# Patient Record
Sex: FEMALE | Race: WHITE | NOT HISPANIC OR LATINO
[De-identification: names, ages, dates, MRNs, and addresses within clinical notes are randomized per-mention and may not be internally consistent; named-entity substitution may affect disease eponyms.]

---

## 2023-10-22 PROBLEM — Z00.00 ENCOUNTER FOR PREVENTIVE HEALTH EXAMINATION: Status: ACTIVE | Noted: 2023-10-22

## 2023-10-23 ENCOUNTER — RESULT REVIEW (OUTPATIENT)
Age: 43
End: 2023-10-23

## 2023-10-23 ENCOUNTER — APPOINTMENT (OUTPATIENT)
Dept: BARIATRICS | Facility: CLINIC | Age: 43
End: 2023-10-23
Payer: COMMERCIAL

## 2023-10-23 ENCOUNTER — OUTPATIENT (OUTPATIENT)
Dept: OUTPATIENT SERVICES | Facility: HOSPITAL | Age: 43
LOS: 1 days | End: 2023-10-23
Payer: COMMERCIAL

## 2023-10-23 VITALS
BODY MASS INDEX: 36.07 KG/M2 | SYSTOLIC BLOOD PRESSURE: 133 MMHG | DIASTOLIC BLOOD PRESSURE: 90 MMHG | TEMPERATURE: 97.9 F | HEART RATE: 61 BPM | WEIGHT: 196 LBS | OXYGEN SATURATION: 98 % | HEIGHT: 62 IN

## 2023-10-23 DIAGNOSIS — Z78.9 OTHER SPECIFIED HEALTH STATUS: ICD-10-CM

## 2023-10-23 DIAGNOSIS — K83.8 OTHER SPECIFIED DISEASES OF BILIARY TRACT: ICD-10-CM

## 2023-10-23 DIAGNOSIS — Z86.39 PERSONAL HISTORY OF OTHER ENDOCRINE, NUTRITIONAL AND METABOLIC DISEASE: ICD-10-CM

## 2023-10-23 DIAGNOSIS — K80.20 CALCULUS OF GALLBLADDER W/OUT CHOLECYSTITIS W/OUT OBSTRUCTION: ICD-10-CM

## 2023-10-23 PROCEDURE — 71046 X-RAY EXAM CHEST 2 VIEWS: CPT

## 2023-10-23 PROCEDURE — 99204 OFFICE O/P NEW MOD 45 MIN: CPT

## 2023-10-23 PROCEDURE — 71046 X-RAY EXAM CHEST 2 VIEWS: CPT | Mod: 26

## 2023-10-23 RX ORDER — CIPROFLOXACIN HYDROCHLORIDE 750 MG/1
TABLET, FILM COATED ORAL
Refills: 0 | Status: ACTIVE | COMMUNITY

## 2023-10-23 RX ORDER — METRONIDAZOLE 500 MG/1
500 TABLET ORAL
Refills: 0 | Status: ACTIVE | COMMUNITY

## 2023-10-31 ENCOUNTER — APPOINTMENT (OUTPATIENT)
Dept: MRI IMAGING | Facility: HOSPITAL | Age: 43
End: 2023-10-31

## 2023-10-31 ENCOUNTER — OUTPATIENT (OUTPATIENT)
Dept: OUTPATIENT SERVICES | Facility: HOSPITAL | Age: 43
LOS: 1 days | End: 2023-10-31
Payer: COMMERCIAL

## 2023-10-31 PROCEDURE — 74183 MRI ABD W/O CNTR FLWD CNTR: CPT | Mod: 26

## 2023-10-31 PROCEDURE — 74183 MRI ABD W/O CNTR FLWD CNTR: CPT

## 2023-10-31 PROCEDURE — A9585: CPT

## 2023-11-03 ENCOUNTER — TRANSCRIPTION ENCOUNTER (OUTPATIENT)
Age: 43
End: 2023-11-03

## 2023-11-06 ENCOUNTER — TRANSCRIPTION ENCOUNTER (OUTPATIENT)
Age: 43
End: 2023-11-06

## 2023-11-06 VITALS
HEART RATE: 64 BPM | HEIGHT: 62 IN | SYSTOLIC BLOOD PRESSURE: 125 MMHG | RESPIRATION RATE: 14 BRPM | WEIGHT: 194.45 LBS | OXYGEN SATURATION: 97 % | DIASTOLIC BLOOD PRESSURE: 87 MMHG | TEMPERATURE: 97 F

## 2023-11-06 NOTE — PRE-ANESTHESIA EVALUATION ADULT - NSANTHOSAYNRD_GEN_A_CORE
No. GONSALO screening performed.  STOP BANG Legend: 0-2 = LOW Risk; 3-4 = INTERMEDIATE Risk; 5-8 = HIGH Risk

## 2023-11-07 ENCOUNTER — TRANSCRIPTION ENCOUNTER (OUTPATIENT)
Age: 43
End: 2023-11-07

## 2023-11-07 ENCOUNTER — APPOINTMENT (OUTPATIENT)
Dept: BARIATRICS | Facility: HOSPITAL | Age: 43
End: 2023-11-07

## 2023-11-07 ENCOUNTER — RESULT REVIEW (OUTPATIENT)
Age: 43
End: 2023-11-07

## 2023-11-07 ENCOUNTER — OUTPATIENT (OUTPATIENT)
Dept: OUTPATIENT SERVICES | Facility: HOSPITAL | Age: 43
LOS: 1 days | Discharge: HOME | End: 2023-11-07
Payer: COMMERCIAL

## 2023-11-07 VITALS
DIASTOLIC BLOOD PRESSURE: 79 MMHG | SYSTOLIC BLOOD PRESSURE: 132 MMHG | RESPIRATION RATE: 16 BRPM | OXYGEN SATURATION: 95 % | HEART RATE: 59 BPM

## 2023-11-07 DIAGNOSIS — Z94.5 SKIN TRANSPLANT STATUS: Chronic | ICD-10-CM

## 2023-11-07 DIAGNOSIS — Z98.890 OTHER SPECIFIED POSTPROCEDURAL STATES: Chronic | ICD-10-CM

## 2023-11-07 PROCEDURE — 88304 TISSUE EXAM BY PATHOLOGIST: CPT

## 2023-11-07 PROCEDURE — S2900 ROBOTIC SURGICAL SYSTEM: CPT | Mod: NC

## 2023-11-07 PROCEDURE — 47562 LAPAROSCOPIC CHOLECYSTECTOMY: CPT

## 2023-11-07 PROCEDURE — S2900: CPT

## 2023-11-07 PROCEDURE — 47562 LAPAROSCOPIC CHOLECYSTECTOMY: CPT | Mod: AS

## 2023-11-07 PROCEDURE — 88304 TISSUE EXAM BY PATHOLOGIST: CPT | Mod: 26

## 2023-11-07 RX ORDER — ACETAMINOPHEN 500 MG
1000 TABLET ORAL ONCE
Refills: 0 | Status: COMPLETED | OUTPATIENT
Start: 2023-11-07 | End: 2023-11-07

## 2023-11-07 RX ORDER — OXYCODONE HYDROCHLORIDE 5 MG/1
10 TABLET ORAL EVERY 4 HOURS
Refills: 0 | Status: DISCONTINUED | OUTPATIENT
Start: 2023-11-07 | End: 2023-11-07

## 2023-11-07 RX ORDER — KETOROLAC TROMETHAMINE 30 MG/ML
15 SYRINGE (ML) INJECTION EVERY 8 HOURS
Refills: 0 | Status: DISCONTINUED | OUTPATIENT
Start: 2023-11-07 | End: 2023-11-07

## 2023-11-07 RX ORDER — BUPROPION HYDROCHLORIDE 150 MG/1
1 TABLET, EXTENDED RELEASE ORAL
Refills: 0 | DISCHARGE

## 2023-11-07 RX ORDER — SODIUM CHLORIDE 9 MG/ML
1000 INJECTION, SOLUTION INTRAVENOUS
Refills: 0 | Status: DISCONTINUED | OUTPATIENT
Start: 2023-11-07 | End: 2023-11-07

## 2023-11-07 RX ORDER — KETOROLAC TROMETHAMINE 30 MG/ML
1 SYRINGE (ML) INJECTION
Qty: 12 | Refills: 0
Start: 2023-11-07 | End: 2023-11-09

## 2023-11-07 RX ORDER — ONDANSETRON 8 MG/1
4 TABLET, FILM COATED ORAL EVERY 6 HOURS
Refills: 0 | Status: DISCONTINUED | OUTPATIENT
Start: 2023-11-07 | End: 2023-11-07

## 2023-11-07 RX ORDER — NALTREXONE HYDROCHLORIDE 50 MG/1
1 TABLET, FILM COATED ORAL
Refills: 0 | DISCHARGE

## 2023-11-07 RX ORDER — OXYCODONE HYDROCHLORIDE 5 MG/1
5 TABLET ORAL EVERY 6 HOURS
Refills: 0 | Status: DISCONTINUED | OUTPATIENT
Start: 2023-11-07 | End: 2023-11-07

## 2023-11-07 RX ORDER — ESCITALOPRAM OXALATE 10 MG/1
1 TABLET, FILM COATED ORAL
Refills: 0 | DISCHARGE

## 2023-11-07 RX ORDER — ACETAMINOPHEN 500 MG
650 TABLET ORAL EVERY 6 HOURS
Refills: 0 | Status: DISCONTINUED | OUTPATIENT
Start: 2023-11-07 | End: 2023-11-07

## 2023-11-07 RX ORDER — HYDROMORPHONE HYDROCHLORIDE 2 MG/ML
0.5 INJECTION INTRAMUSCULAR; INTRAVENOUS; SUBCUTANEOUS
Refills: 0 | Status: DISCONTINUED | OUTPATIENT
Start: 2023-11-07 | End: 2023-11-07

## 2023-11-07 RX ADMIN — Medication 400 MILLIGRAM(S): at 18:00

## 2023-11-07 RX ADMIN — ONDANSETRON 4 MILLIGRAM(S): 8 TABLET, FILM COATED ORAL at 17:21

## 2023-11-07 RX ADMIN — OXYCODONE HYDROCHLORIDE 5 MILLIGRAM(S): 5 TABLET ORAL at 18:59

## 2023-11-07 RX ADMIN — Medication 15 MILLIGRAM(S): at 17:12

## 2023-11-07 NOTE — BRIEF OPERATIVE NOTE - OPERATION/FINDINGS
Patient supine prepped in sterile fashion. Time out done. Patient placed in reverse Trendelenburg with right side up. Omental attachments to GB were gently swept away. Gallbladder fundus retracted superiorly over dome of liver. Filmy adhesions between the gallbladder and omentum/duodenum cauterized. Gallbladder infundibulum retracted laterally towards RLQ exposing Calot’s triangle. Critical view of safety obtained. Cystic duct skeletonized, clipped and divided. Peritoneal attachments between gallbladder and liver bed  with electrocautery. Hemostasis achieved. No leakage of bile from cystic duct stump. Gallbladder and stones removed via endocatch. Fascia closed and skin closed with monocryl and covered with sterile dressing. Patient tolerated procedure well and transported to recovery in stable condition.  No qualified resident was available to assist at bedside. A PA was necessary to complete the procedure. A resident was present for observational/educational purposes only.

## 2023-11-07 NOTE — ASU DISCHARGE PLAN (ADULT/PEDIATRIC) - CARE PROVIDER_API CALL
Héctor Armenta  Surgery  [address], [address]  [place] [address]  Phone: [phone]  Fax: [phone]  Follow Up Time:

## 2023-11-07 NOTE — ASU DISCHARGE PLAN (ADULT/PEDIATRIC) - ASU DC SPECIAL INSTRUCTIONSFT
Follow up with Dr. Armenta in 1-2 weeks. Call the office to schedule your appointment. Instructions for follow-up, activity and diet. Please resume all regular home medications unless specifically advised not to take a particular medication. Also, please take any new medications as prescribed.    Please get plenty of rest, continue to ambulate several times per day, and drink adequate amounts of fluids. Avoid lifting weights greater than 5-10 lbs until you follow-up with your surgeon, who will instruct you further regarding activity restrictions. Avoid driving or operating heavy machinery while taking pain medications. You may shower letting soap and water run over incisions. Pat dry with clean towel when finished.    Call the office if you experience increasing abdominal pain, nausea, vomiting, or temperature >101 F. Follow up with Dr. Armenta in 1-2 weeks. Call the office to schedule your appointment. Please resume all regular home medications unless specifically advised not to take a particular medication. Also, please take any new medications as prescribed.    Please get plenty of rest, continue to ambulate several times per day, and drink adequate amounts of fluids. Avoid lifting weights greater than 5-10 lbs until you follow-up with your surgeon, who will instruct you further regarding activity restrictions. Avoid driving or operating heavy machinery while taking pain medications. You may shower letting soap and water run over incisions. Pat dry with clean towel when finished.    Call the office if you experience increasing abdominal pain, nausea, vomiting, or temperature >101 F. Follow up with Dr. Armenta in 1-2 weeks. Call the office to schedule your appointment. Please resume all regular home medications except Naltrexone for weight loss. Do not take Naltrexone for two or three days while taking oxycodone and follow up with you PCP. Also, please take any new medications as prescribed.    Please get plenty of rest, continue to ambulate several times per day, and drink adequate amounts of fluids. Avoid lifting weights greater than 5-10 lbs until you follow-up with your surgeon, who will instruct you further regarding activity restrictions. Avoid driving or operating heavy machinery while taking pain medications. You may shower letting soap and water run over incisions. Pat dry with clean towel when finished.    Call the office if you experience increasing abdominal pain, nausea, vomiting, or temperature >101 F. Follow up with Dr. Armenta in 1-2 weeks. Call the office to schedule your appointment. Please resume all regular home medications unless specifically advised. Please  prescription for Toradol for pain before you leave the hospital and alternate with Tylenol.    Please get plenty of rest, continue to ambulate several times per day, and drink adequate amounts of fluids. Avoid lifting weights greater than 5-10 lbs until you follow-up with your surgeon, who will instruct you further regarding activity restrictions. Avoid driving or operating heavy machinery while taking pain medications. You may shower letting soap and water run over incisions. Pat dry with clean towel when finished.    Call the office if you experience increasing abdominal pain, nausea, vomiting, or temperature >101 F.

## 2023-11-07 NOTE — ASU DISCHARGE PLAN (ADULT/PEDIATRIC) - NS MD DC FALL RISK RISK
For information on Fall & Injury Prevention, visit: [email or web address] OR  [email or web address] OR  https://www.cdc.gov/steadi/patient.html

## 2023-11-07 NOTE — CHART NOTE - NSCHARTNOTEFT_GEN_A_CORE
Team 2 Surgery Post-Op Note, PCN:     Pre-Op Dx: Cholethiasis   Procedure: Robot-assisted laparoscopic cholecystectomy      Surgeon: Kathi    Subjective: Patient seen at bedside in PACU. Patient denies any shortness of breath, chest pain, or vomiting. Patient reports epigastric pain and nausea.       Vital Signs Last 24 Hrs  T(C): 36.7 (07 Nov 2023 16:14), Max: 36.7 (07 Nov 2023 16:14)  T(F): 98 (07 Nov 2023 16:14), Max: 98 (07 Nov 2023 16:14)  HR: 57 (07 Nov 2023 18:04) (54 - 61)  BP: 127/76 (07 Nov 2023 18:04) (126/75 - 144/82)  BP(mean): 96 (07 Nov 2023 18:04) (88 - 105)  RR: 11 (07 Nov 2023 18:04) (7 - 20)  SpO2: 94% (07 Nov 2023 18:04) (93% - 99%)    Parameters below as of 07 Nov 2023 16:44  Patient On (Oxygen Delivery Method): nasal cannula  O2 Flow (L/min): 3      Physical Exam:  General: NAD, resting comfortably in bed  Pulmonary: Nonlabored breathing, no respiratory distress  Cardiovascular: NSR  Abdominal: soft, ND, diffuse abdominal tenderness, Incision clean, dry, and intact. ICe packs in place, IV Tylenol ordered   Extremities: WWP, normal strength  Neuro: A/O x 3, CNs II-XII grossly intact, no focal deficits, normal sensation  Pulses: palpable distal pulses      LABS:            CAPILLARY BLOOD GLUCOSE                  Radiology and Additional Studies:    Assessment:43y Female s/p above procedure    Plan:  Pain/nausea control PRN  Home meds  Incentive spirometer/OOB/Ambulate  Regular diet

## 2023-11-15 LAB
SURGICAL PATHOLOGY STUDY: SIGNIFICANT CHANGE UP
SURGICAL PATHOLOGY STUDY: SIGNIFICANT CHANGE UP

## 2023-12-06 ENCOUNTER — APPOINTMENT (OUTPATIENT)
Dept: BARIATRICS | Facility: CLINIC | Age: 43
End: 2023-12-06
Payer: COMMERCIAL

## 2023-12-06 VITALS
TEMPERATURE: 97.3 F | BODY MASS INDEX: 35.33 KG/M2 | SYSTOLIC BLOOD PRESSURE: 127 MMHG | DIASTOLIC BLOOD PRESSURE: 90 MMHG | HEIGHT: 62 IN | OXYGEN SATURATION: 98 % | HEART RATE: 91 BPM | WEIGHT: 192 LBS

## 2023-12-06 DIAGNOSIS — K80.20 CALCULUS OF GALLBLADDER W/OUT CHOLECYSTITIS W/OUT OBSTRUCTION: ICD-10-CM

## 2023-12-06 DIAGNOSIS — F41.9 ANXIETY DISORDER, UNSPECIFIED: ICD-10-CM

## 2023-12-06 DIAGNOSIS — F32.A DEPRESSION, UNSPECIFIED: ICD-10-CM

## 2023-12-06 PROCEDURE — 99024 POSTOP FOLLOW-UP VISIT: CPT

## 2024-04-24 NOTE — PACU DISCHARGE NOTE - THE ANESTHESIA ORDERS USED IN THE PACU ORDER SET WILL BE DISCONTINUED UPON TRANSFER OF THIS PATIENT
Year supply sent to Chelsea Marine 
Statement Selected

## (undated) DEVICE — D HELP - CLEARVIEW CLEARIFY SYSTEM

## (undated) DEVICE — SOL IRR BAG NS 0.9% 3000ML

## (undated) DEVICE — SUT MONOCRYL 4-0 27" PS-2 UNDYED

## (undated) DEVICE — GLV 7 PROTEXIS (WHITE)

## (undated) DEVICE — DRSG DERMABOND 0.7ML

## (undated) DEVICE — XI DRAPE ARM

## (undated) DEVICE — XI TIP COVER

## (undated) DEVICE — XI OBTURATOR OPTICAL BLADELESS 8MM

## (undated) DEVICE — MONOPOLAR CORD HI FREQ DISPOSABLE

## (undated) DEVICE — TIP METZENBAUM SCISSOR MONOPOLAR ENDOCUT (ORANGE)

## (undated) DEVICE — DRAPE 3/4 SHEET 52X76"

## (undated) DEVICE — INSUFFLATION NDL COVIDIEN SURGINEEDLE VERESS 120MM

## (undated) DEVICE — XI ENDOWRIST SUCTION IRRIGATOR 8MM

## (undated) DEVICE — SUT VICRYL 0 27" UR-6

## (undated) DEVICE — PACK GENERAL LAPAROSCOPY

## (undated) DEVICE — XI SEAL UNIV 5- 8 MM

## (undated) DEVICE — XI DRAPE COLUMN

## (undated) DEVICE — BLADE SCALPEL SAFETY #11 WITH PLASTIC GREEN HANDLE

## (undated) DEVICE — VENODYNE/SCD SLEEVE CALF MEDIUM

## (undated) DEVICE — MARKING PEN W RULER

## (undated) DEVICE — TUBING STRYKER PNEUMOCLEAR HIGH FLOW

## (undated) DEVICE — ELCTR BOVIE PENCIL BLADE 10FT

## (undated) DEVICE — ENDOCATCH 10MM SPECIMEN POUCH

## (undated) DEVICE — DRAPE 1/2 SHEET 40X57"

## (undated) DEVICE — Device

## (undated) DEVICE — WARMING BLANKET UPPER ADULT

## (undated) DEVICE — DRAPE TOP SHEET 53" X 101"